# Patient Record
(demographics unavailable — no encounter records)

---

## 2024-10-18 NOTE — HEALTH RISK ASSESSMENT
[Yes] : Yes [2 - 3 times a week (3 pts)] : 2 - 3  times a week (3 points) [Less than monthly (1 pt)] : Less than monthly (1 point) [0] : 2) Feeling down, depressed, or hopeless: Not at all (0) [PHQ-2 Negative - No further assessment needed] : PHQ-2 Negative - No further assessment needed [Time Spent: ___ Minutes] : I spent [unfilled] minutes performing a depression screening for this patient. [Hepatitis C test offered] : Hepatitis C test offered [With Significant Other] : lives with significant other [Employed] : employed [] :  [Never] : Never [NO] : No [[id number]] : 0 [de-identified] : [employer]

## 2024-10-18 NOTE — HISTORY OF PRESENT ILLNESS
[de-identified] : 43-year-old white male presents for complete checkup patient denies chest pain shortness of breath fever chills abdominal pain he complains of occasional bright red blood per rectum on the paper not in the bowl

## 2024-10-18 NOTE — ASSESSMENT
[FreeTextEntry1] : Diet and exercise Check complete blood work  exam check PSA colonoscopy ophthalmology vitamin D Depression screen done and reviewed 5 minutes Up-to-date on vaccines Received flu shot at pharmacy Rectal bleeding refer to GI for evaluation likely hemorrhoidal Hyperlipidemia check complete blood work and advise Follow-up after above workup completed